# Patient Record
Sex: MALE | Race: WHITE | NOT HISPANIC OR LATINO | Employment: FULL TIME | URBAN - METROPOLITAN AREA
[De-identification: names, ages, dates, MRNs, and addresses within clinical notes are randomized per-mention and may not be internally consistent; named-entity substitution may affect disease eponyms.]

---

## 2017-08-22 ENCOUNTER — TELEPHONE (OUTPATIENT)
Dept: ORTHOPEDIC SURGERY | Facility: CLINIC | Age: 62
End: 2017-08-22

## 2017-08-22 NOTE — TELEPHONE ENCOUNTER
PATIENT CALLED REQUESTING COPY OF HIS X RAYS FROM 2016, PLEASE GIVE TO ME WHEN READY SO I CAN PUT WITH HISS RECORDS. THANKS